# Patient Record
Sex: FEMALE | Race: WHITE | Employment: FULL TIME | URBAN - METROPOLITAN AREA
[De-identification: names, ages, dates, MRNs, and addresses within clinical notes are randomized per-mention and may not be internally consistent; named-entity substitution may affect disease eponyms.]

---

## 2023-08-10 ENCOUNTER — HOSPITAL ENCOUNTER (OUTPATIENT)
Age: 63
Discharge: HOME | End: 2023-08-10
Payer: COMMERCIAL

## 2023-08-10 VITALS
SYSTOLIC BLOOD PRESSURE: 133 MMHG | HEART RATE: 70 BPM | OXYGEN SATURATION: 100 % | RESPIRATION RATE: 18 BRPM | TEMPERATURE: 97 F | DIASTOLIC BLOOD PRESSURE: 66 MMHG

## 2023-08-10 DIAGNOSIS — S50.861A INSECT BITE OF RIGHT FOREARM, INITIAL ENCOUNTER: Primary | ICD-10-CM
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
## 2023-08-10 RX ORDER — CEFADROXIL 500 MG/1
500 CAPSULE ORAL 2 TIMES DAILY
Qty: 14 CAPSULE | Refills: 0 | Status: SHIPPED | OUTPATIENT
Start: 2023-08-10 | End: 2023-08-17

## 2023-08-10 RX ORDER — PREDNISONE 20 MG/1
40 TABLET ORAL DAILY
Qty: 10 TABLET | Refills: 0 | Status: SHIPPED | OUTPATIENT
Start: 2023-08-10 | End: 2023-08-15

## 2023-08-10 NOTE — DISCHARGE INSTRUCTIONS
Prednisone 2 tablets daily for 5 days, take with food  Cefadroxil 1 tablet twice per day for 7 days  Zyrtec 10 mg daily  For fever, or worsening symptoms, please go to ER  Please follow up with your primary care provider in 2 days if no improvement

## 2025-01-14 ENCOUNTER — APPOINTMENT (OUTPATIENT)
Dept: GENERAL RADIOLOGY | Age: 65
End: 2025-01-14
Attending: NURSE PRACTITIONER
Payer: MEDICARE

## 2025-01-14 ENCOUNTER — HOSPITAL ENCOUNTER (OUTPATIENT)
Age: 65
Discharge: HOME | End: 2025-01-14
Payer: MEDICARE

## 2025-01-14 VITALS
OXYGEN SATURATION: 97 % | TEMPERATURE: 98 F | RESPIRATION RATE: 18 BRPM | SYSTOLIC BLOOD PRESSURE: 144 MMHG | HEART RATE: 78 BPM | DIASTOLIC BLOOD PRESSURE: 72 MMHG

## 2025-01-14 DIAGNOSIS — S52.571A OTHER CLOSED INTRA-ARTICULAR FRACTURE OF DISTAL END OF RIGHT RADIUS, INITIAL ENCOUNTER: Primary | ICD-10-CM

## 2025-01-14 DIAGNOSIS — S69.91XA INJURY OF RIGHT WRIST, INITIAL ENCOUNTER: ICD-10-CM

## 2025-01-14 PROCEDURE — 73110 X-RAY EXAM OF WRIST: CPT | Performed by: NURSE PRACTITIONER

## 2025-01-14 NOTE — ED PROVIDER NOTES
Patient Seen in: [facility]      History     Chief Complaint   Patient presents with    Arm or Hand Injury     Entered by patient     Stated Complaint: Arm or Hand Injury    Subjective: This is a 64-year-old female, no significant past medical history, presents to immediate care clinic for evaluation of right wrist pain.  Patient states she slipped while walking her dog on Friday.  Describes positive FOOSH like mechanism.  States she attempted to take care of her injury at home with Tylenol, Motrin, ice.  She states she is still with pain.  Right-hand-dominant.  No obvious asymmetry formally on exam.  No previous history of injury to right upper extremity.  She does have soft tissue swelling ecchymosis on exam.  Strong radial pulse.  Good cap refill.  AOx4.  The history is provided by the patient.             Objective:     History reviewed. No pertinent past medical history.           History reviewed. No pertinent surgical history.             No pertinent social history.            Review of Systems   Constitutional: Negative.    Musculoskeletal:  Positive for arthralgias, joint swelling and myalgias.   Skin:  Positive for color change. Negative for wound.       Positive for stated complaint: Arm or Hand Injury  Other systems are as noted in HPI.  Constitutional and vital signs reviewed.      All other systems reviewed and negative except as noted above.    Physical Exam     ED Triage Vitals [01/14/25 0930]   /72   Pulse 78   Resp 18   Temp 98.4 °F (36.9 °C)   Temp src Oral   SpO2 97 %   O2 Device None (Room air)       Current Vitals:   Vital Signs  BP: 144/72  Pulse: 78  Resp: 18  Temp: 98.4 °F (36.9 °C)  Temp src: Oral    Oxygen Therapy  SpO2: 97 %  O2 Device: None (Room air)        Physical Exam  Constitutional:       General: She is not in acute distress.     Appearance: Normal appearance. She is not ill-appearing or toxic-appearing.   HENT:      Head: Normocephalic.   Musculoskeletal:          General: Swelling, tenderness and signs of injury present. No deformity. Normal range of motion.      Right wrist: Swelling, tenderness and bony tenderness present. No deformity, effusion, lacerations, snuff box tenderness or crepitus. Normal range of motion. Normal pulse.        Arms:    Skin:     General: Skin is warm.      Capillary Refill: Capillary refill takes less than 2 seconds.      Findings: Bruising present.   Neurological:      General: No focal deficit present.      Mental Status: She is alert and oriented to person, place, and time.             ED Course   Labs Reviewed - No data to display  XR WRIST COMPLETE (MIN 3 VIEWS), RIGHT (CPT=73110)    Result Date: 1/14/2025  CONCLUSION:   Nondisplaced distal radial metadiaphyseal fracture with suspected intra-articular extension to the radiocarpal joint.  Mild widening of the distal radial ulnar interval, with extension of the fracture line to this level, suggesting involvement and possible distal radioulnar joint injury.  Moderate first CMCm, radiocarpal, and triscaphe joint osteoarthritis.  Lesser degrees of osteoarthritis elsewhere.    Dictated by (CST): Natalia Louis MD on 1/14/2025 at 10:09 AM     Finalized by (CST): Natalia Louis MD on 1/14/2025 at 10:12 AM                       MDM      Differentials considered include: Colles fracture, bartons fracture, ulnar styloid fracture (Chauffers fracture) , wrist sprain.    Dedicated x-ray of right wrist obtained.  X-ray independently reviewed by provider.  Positive distal radius fracture.  However, no displacement.  Per radiologist it does appear that there is a nondisplaced distal radial metadiaphyseal fracture with suspected intra-articular extension to the radiocarpal joint.  Per radiologist there is mild widening of the distal radial ulnar interval with extension of the fracture line to this level.  This suggest possible distal radial ulnar joint injury.    Will place patient in sugar-tong  short arm postmold with sling.  Postmold and sling checked by provider after application.  No neurovascular compromise after postmold and sling application.    Did place patient on schedule for orthopedic physician.  Patient to call office to schedule as they need to clarify insurance information.  However, openings tomorrow morning or Friday morning.    Patient is aware of RICE therapy.  She is aware she only needs to wear sling while up, awake, alert, active.  Remove while sleeping and resting.  Elevate extremity sleeping resting.  Tylenol every 4 hours Motrin for 6 hours.    She is aware of signs symptoms of compartment syndrome.  Educated patient if the signs symptoms occur to remove Ace bandage wrap in postmold and go to ER.  She verbalized understanding and agree with plan of care.            Medical Decision Making  Amount and/or Complexity of Data Reviewed  Radiology: ordered and independent interpretation performed. Decision-making details documented in ED Course.        Disposition and Plan     Clinical Impression:  1. Other closed intra-articular fracture of distal end of right radius, initial encounter    2. Injury of right wrist, initial encounter         Disposition:  Discharge  1/14/2025 10:36 am    Follow-up:  Stephen Garza MD  [address]  [place] [address]  [phone]    Schedule an appointment as soon as possible for a visit       David Mark MD  [address] [facility] [address]  [phone]      call to schedule          Medications Prescribed:  There are no discharge medications for this patient.          Supplementary Documentation:

## 2025-01-14 NOTE — ED INITIAL ASSESSMENT (HPI)
Patient presents with swelling, pain and bruising to right hand/wrist and forearm x 4 days. States she fell while walking her dog.

## 2025-01-14 NOTE — DISCHARGE INSTRUCTIONS
As discussed, it does appear that you have a distal radius fracture.  You are placed in a temporary cast and given a sling.  The sling only needs to be worn while you are up, awake, alert, active.  Please remove while sleeping and resting.  Elevate extremity while sleeping and resting.    I will contact an orthopedic specialist who can get you on the schedule for tomorrow.  They would like you to call their office to schedule and confirm.    You may take Tylenol and Motrin as needed for pain alleviation.    If there is any worsening pain, swelling, decreased sensation to your fingertips, discoloration, temperature change, please remove Ace bandage and cast and go to ER for further evaluation.